# Patient Record
Sex: MALE | URBAN - METROPOLITAN AREA
[De-identification: names, ages, dates, MRNs, and addresses within clinical notes are randomized per-mention and may not be internally consistent; named-entity substitution may affect disease eponyms.]

---

## 2020-06-23 ENCOUNTER — WEB ENCOUNTER (OUTPATIENT)
Dept: URBAN - METROPOLITAN AREA CLINIC 92 | Facility: CLINIC | Age: 65
End: 2020-06-23

## 2020-06-24 ENCOUNTER — OFFICE VISIT (OUTPATIENT)
Dept: URBAN - METROPOLITAN AREA TELEHEALTH 2 | Facility: TELEHEALTH | Age: 65
End: 2020-06-24
Payer: COMMERCIAL

## 2020-06-24 ENCOUNTER — DASHBOARD ENCOUNTERS (OUTPATIENT)
Age: 65
End: 2020-06-24

## 2020-06-24 DIAGNOSIS — R10.84 ABDOMINAL PAIN, GENERALIZED: ICD-10-CM

## 2020-06-24 DIAGNOSIS — K59.01 CONSTIPATION: ICD-10-CM

## 2020-06-24 DIAGNOSIS — R07.89 ACUTE CHEST WALL PAIN: ICD-10-CM

## 2020-06-24 DIAGNOSIS — R12 HEART BURN: ICD-10-CM

## 2020-06-24 DIAGNOSIS — R14.0 BLOATING: ICD-10-CM

## 2020-06-24 DIAGNOSIS — I25.9 CAD (CORONARY ARTERY DISEASE): ICD-10-CM

## 2020-06-24 PROBLEM — 53741008 CORONARY ARTERY DISEASE: Status: ACTIVE | Noted: 2020-06-24

## 2020-06-24 PROBLEM — 29857009 CHEST PAIN: Status: ACTIVE | Noted: 2020-06-24

## 2020-06-24 PROBLEM — 16331000 HEARTBURN: Status: ACTIVE | Noted: 2020-06-24

## 2020-06-24 PROBLEM — 14760008 CONSTIPATION: Status: ACTIVE | Noted: 2020-06-24

## 2020-06-24 PROBLEM — 21522001 ABDOMINAL PAIN: Status: ACTIVE | Noted: 2020-06-24

## 2020-06-24 PROCEDURE — G9903 PT SCRN TBCO ID AS NON USER: HCPCS | Performed by: INTERNAL MEDICINE

## 2020-06-24 PROCEDURE — 3017F COLORECTAL CA SCREEN DOC REV: CPT | Performed by: INTERNAL MEDICINE

## 2020-06-24 PROCEDURE — 99203 OFFICE O/P NEW LOW 30 MIN: CPT | Performed by: INTERNAL MEDICINE

## 2020-06-24 PROCEDURE — G8417 CALC BMI ABV UP PARAM F/U: HCPCS | Performed by: INTERNAL MEDICINE

## 2020-06-24 PROCEDURE — G8427 DOCREV CUR MEDS BY ELIG CLIN: HCPCS | Performed by: INTERNAL MEDICINE

## 2020-06-24 PROCEDURE — 1036F TOBACCO NON-USER: CPT | Performed by: INTERNAL MEDICINE

## 2020-06-24 RX ORDER — LOSARTAN POTASSIUM 100 MG/1
1 TABLET TABLET ORAL ONCE A DAY
Status: ACTIVE | COMMUNITY

## 2020-06-24 RX ORDER — SUCRALFATE 1 G/1
1 TABLET ON AN EMPTY STOMACH TABLET ORAL TWICE A DAY
Status: ACTIVE | COMMUNITY

## 2020-06-24 RX ORDER — PRAVASTATIN SODIUM 40 MG/1
1 TABLET TABLET ORAL ONCE A DAY
Status: ACTIVE | COMMUNITY

## 2020-06-24 RX ORDER — BUSPIRONE HYDROCHLORIDE 5 MG/1
1 TABLET TABLET ORAL THREE TIMES A DAY
Status: ACTIVE | COMMUNITY

## 2020-06-24 RX ORDER — TICAGRELOR 90 MG/1
1 TABLET TABLET ORAL TWICE A DAY
Status: ACTIVE | COMMUNITY

## 2020-06-24 RX ORDER — ALPRAZOLAM 0.25 MG/1
1 TABLET TABLET ORAL TWICE A DAY
Status: ACTIVE | COMMUNITY

## 2020-06-24 RX ORDER — HYOSCYAMINE SULFATE 0.12 MG/ML
1 ML AS NEEDED LIQUID ORAL
Status: ACTIVE | COMMUNITY

## 2020-06-24 RX ORDER — FAMOTIDINE 20 MG/1
1 TABLET AT BEDTIME AS NEEDED TABLET, FILM COATED ORAL ONCE A DAY
Status: ACTIVE | COMMUNITY

## 2020-06-24 NOTE — HPI-TODAY'S VISIT:
This is a 64-year-old  male who presents today for initial consultation.  He has recently relocated to [facility] from [facility][note 3].  He has a few GI symptoms at present.  Most recently in the last month or so he presented to the emergency room with increasing substernal chest burning and epigastric burning abdominal pain.  He was given a GI cocktail and ultimately ruled in for cardiac pathology.  He required a heart catheterization and stent placement.  Since that time he has been taking an H2 blocker, sucralfate.  He notes approximately 50% improvement in his symptoms.  He has also had intermittent mild constipation with associated abdominal bloating.  He has taken over-the-counter laxatives with relief. He did see a gastroenterologist prior to his move a few weeks ago and was prescribed Levsin which has also provided some relief. He did have an upper endoscopy and colonoscopy approximately 5 years ago, which was reportedly normal.

## 2024-12-11 ENCOUNTER — OFFICE VISIT (OUTPATIENT)
Dept: RURAL CLINIC 2 | Facility: CLINIC | Age: 69
End: 2024-12-11
Payer: MEDICARE

## 2024-12-11 ENCOUNTER — LAB OUTSIDE AN ENCOUNTER (OUTPATIENT)
Dept: RURAL CLINIC 2 | Facility: CLINIC | Age: 69
End: 2024-12-11

## 2024-12-11 VITALS
SYSTOLIC BLOOD PRESSURE: 135 MMHG | BODY MASS INDEX: 31.36 KG/M2 | HEIGHT: 71 IN | DIASTOLIC BLOOD PRESSURE: 74 MMHG | TEMPERATURE: 97.1 F | WEIGHT: 224 LBS | HEART RATE: 57 BPM

## 2024-12-11 DIAGNOSIS — R10.13 DYSPEPSIA: ICD-10-CM

## 2024-12-11 DIAGNOSIS — K21.9 GERD WITHOUT ESOPHAGITIS: ICD-10-CM

## 2024-12-11 DIAGNOSIS — Z95.5 H/O HEART ARTERY STENT: ICD-10-CM

## 2024-12-11 DIAGNOSIS — Z79.02 ENCOUNTER FOR CURRENT LONG TERM USE OF ANTIPLATELET DRUG: ICD-10-CM

## 2024-12-11 DIAGNOSIS — I25.10 CORONARY ARTERY DISEASE INVOLVING NATIVE CORONARY ARTERY OF NATIVE HEART WITHOUT ANGINA PECTORIS: ICD-10-CM

## 2024-12-11 DIAGNOSIS — R05.3 CHRONIC COUGH: ICD-10-CM

## 2024-12-11 PROBLEM — 449681000124101: Status: ACTIVE | Noted: 2024-12-11

## 2024-12-11 PROBLEM — 266435005: Status: ACTIVE | Noted: 2024-12-11

## 2024-12-11 PROBLEM — 162031009: Status: ACTIVE | Noted: 2024-12-11

## 2024-12-11 PROBLEM — 53741008: Status: ACTIVE | Noted: 2024-12-11

## 2024-12-11 PROBLEM — 68154008: Status: ACTIVE | Noted: 2024-12-11

## 2024-12-11 PROBLEM — 428375006: Status: ACTIVE | Noted: 2024-12-11

## 2024-12-11 PROCEDURE — 99204 OFFICE O/P NEW MOD 45 MIN: CPT | Performed by: NURSE PRACTITIONER

## 2024-12-11 RX ORDER — ATORVASTATIN CALCIUM 20 MG/1
1 TABLET TABLET, FILM COATED ORAL ONCE A DAY
Status: ACTIVE | COMMUNITY

## 2024-12-11 RX ORDER — LOSARTAN POTASSIUM 100 MG/1
1 TABLET TABLET ORAL ONCE A DAY
Status: DISCONTINUED | COMMUNITY

## 2024-12-11 RX ORDER — CHLORTHALIDONE 25 MG/1
1 TABLET IN THE MORNING WITH FOOD TABLET ORAL
Status: DISCONTINUED | COMMUNITY

## 2024-12-11 RX ORDER — OMEPRAZOLE 20 MG/1
1 CAPSULE 1/2 TO 1 HOUR BEFORE MORNING MEAL CAPSULE, DELAYED RELEASE ORAL ONCE A DAY
Status: DISCONTINUED | COMMUNITY

## 2024-12-11 RX ORDER — CHLORTHALIDONE 25 MG/1
1 TABLET IN THE MORNING WITH FOOD TABLET ORAL
Status: ACTIVE | COMMUNITY

## 2024-12-11 RX ORDER — AMLODIPINE BESYLATE 5 MG/1
1 TABLET TABLET ORAL ONCE A DAY
Status: ACTIVE | COMMUNITY

## 2024-12-11 RX ORDER — ATORVASTATIN CALCIUM 40 MG/1
1 TABLET TABLET, FILM COATED ORAL ONCE A DAY
Status: DISCONTINUED | COMMUNITY

## 2024-12-11 RX ORDER — PRAVASTATIN SODIUM 40 MG/1
1 TABLET TABLET ORAL ONCE A DAY
Status: DISCONTINUED | COMMUNITY

## 2024-12-11 RX ORDER — AMLODIPINE BESYLATE 5 MG/1
1 TABLET TABLET ORAL ONCE A DAY
Status: DISCONTINUED | COMMUNITY

## 2024-12-11 RX ORDER — POTASSIUM CHLORIDE 750 MG/1
1 CAPSULE WITH FOOD CAPSULE, EXTENDED RELEASE ORAL TWICE A DAY
Status: ACTIVE | COMMUNITY

## 2024-12-11 RX ORDER — FAMOTIDINE 20 MG/1
1 TABLET AT BEDTIME AS NEEDED TABLET, FILM COATED ORAL ONCE A DAY
Status: DISCONTINUED | COMMUNITY

## 2024-12-11 RX ORDER — HYOSCYAMINE SULFATE 0.12 MG/ML
1 ML AS NEEDED SOLUTION ORAL
Status: DISCONTINUED | COMMUNITY

## 2024-12-11 RX ORDER — ALPRAZOLAM 0.25 MG/1
1 TABLET TABLET ORAL TWICE A DAY
Status: DISCONTINUED | COMMUNITY

## 2024-12-11 RX ORDER — TICAGRELOR 90 MG/1
1 TABLET TABLET ORAL TWICE A DAY
Status: DISCONTINUED | COMMUNITY

## 2024-12-11 RX ORDER — SUCRALFATE 1 G/1
1 TABLET ON AN EMPTY STOMACH TABLET ORAL TWICE A DAY
Status: DISCONTINUED | COMMUNITY

## 2024-12-11 RX ORDER — COLESTIPOL HYDROCHLORIDE 1 G/1
2 TABLETS TABLET, FILM COATED ORAL ONCE A DAY
Status: DISCONTINUED | COMMUNITY

## 2024-12-11 RX ORDER — LOSARTAN POTASSIUM 100 MG/1
1 TABLET TABLET, FILM COATED ORAL ONCE A DAY
Status: ACTIVE | COMMUNITY

## 2024-12-11 RX ORDER — FAMOTIDINE 40 MG/1
1 TABLET TABLET, FILM COATED ORAL ONCE A DAY
Status: ACTIVE | COMMUNITY

## 2024-12-11 RX ORDER — CLOPIDOGREL BISULFATE 75 MG/1
1 TABLET TABLET ORAL ONCE A DAY
Status: ACTIVE | COMMUNITY

## 2024-12-11 RX ORDER — POTASSIUM CHLORIDE 10 MEQ/1
1 TABLET WITH FOOD TABLET, FILM COATED, EXTENDED RELEASE ORAL TWICE A DAY
Status: DISCONTINUED | COMMUNITY

## 2024-12-11 RX ORDER — BUSPIRONE HYDROCHLORIDE 5 MG/1
1 TABLET TABLET ORAL THREE TIMES A DAY
Status: DISCONTINUED | COMMUNITY

## 2024-12-11 NOTE — HPI-TODAY'S VISIT:
This is a 64-year-old  male who presents today for initial consultation.  He has recently relocated to [facility] from [facility].  He has a few GI symptoms at present.  Most recently in the last month or so he presented to the emergency room with increasing substernal chest burning and epigastric burning abdominal pain.  He was given a GI cocktail and ultimately ruled in for cardiac pathology.  He required a heart catheterization and stent placement.  Since that time he has been taking an H2 blocker, sucralfate.  He notes approximately 50% improvement in his symptoms.  He has also had intermittent mild constipation with associated abdominal bloating.  He has taken over-the-counter laxatives with relief. He did see a gastroenterologist prior to his move a few weeks ago and was prescribed Levsin which has also provided some relief. He did have an upper endoscopy and colonoscopy approximately 5 years ago, which was reportedly normal. 12/11/2024 The pt is a 70 y/o gentleman  who is self referred for a 2 month h/o  burning  upper abdominal pain, acid reflux and cough after eating. He is now taking Famotidine twice daily with symptom improvement. No c/o nausea, vomiting or dysphagia. His last colonoscopy was 2 yrs ago and negative for colon polyps. H/o CAD, s/p cardiac catheterization with heart stent placement x2 in 2022. He continues on antiplatelet therapy with Plavix.

## 2025-01-27 ENCOUNTER — CLAIMS CREATED FROM THE CLAIM WINDOW (OUTPATIENT)
Dept: URBAN - METROPOLITAN AREA SURGERY CENTER 14 | Facility: SURGERY CENTER | Age: 70
End: 2025-01-27
Payer: MEDICARE

## 2025-01-27 ENCOUNTER — CLAIMS CREATED FROM THE CLAIM WINDOW (OUTPATIENT)
Dept: URBAN - METROPOLITAN AREA CLINIC 4 | Facility: CLINIC | Age: 70
End: 2025-01-27
Payer: MEDICARE

## 2025-01-27 ENCOUNTER — OFFICE VISIT (OUTPATIENT)
Dept: URBAN - METROPOLITAN AREA SURGERY CENTER 14 | Facility: SURGERY CENTER | Age: 70
End: 2025-01-27

## 2025-01-27 DIAGNOSIS — K30 DYSPEPSIA: ICD-10-CM

## 2025-01-27 DIAGNOSIS — K21.9 GASTRIC REFLUX: ICD-10-CM

## 2025-01-27 DIAGNOSIS — K44.9 HIATAL HERNIA: ICD-10-CM

## 2025-01-27 DIAGNOSIS — K22.9 IRREGULAR Z LINE OF ESOPHAGUS: ICD-10-CM

## 2025-01-27 DIAGNOSIS — K31.89 OTHER DISEASES OF STOMACH AND DUODENUM: ICD-10-CM

## 2025-01-27 DIAGNOSIS — K29.70 GASTRITIS, UNSPECIFIED, WITHOUT BLEEDING: ICD-10-CM

## 2025-01-27 DIAGNOSIS — K21.9 GASTRO-ESOPHAGEAL REFLUX DISEASE WITHOUT ESOPHAGITIS: ICD-10-CM

## 2025-01-27 PROCEDURE — 00731 ANES UPR GI NDSC PX NOS: CPT | Performed by: NURSE ANESTHETIST, CERTIFIED REGISTERED

## 2025-01-27 PROCEDURE — 88305 TISSUE EXAM BY PATHOLOGIST: CPT | Performed by: PATHOLOGY

## 2025-01-27 RX ORDER — CLOPIDOGREL BISULFATE 75 MG/1
1 TABLET TABLET ORAL ONCE A DAY
COMMUNITY

## 2025-01-27 RX ORDER — ATORVASTATIN CALCIUM 20 MG/1
1 TABLET TABLET, FILM COATED ORAL ONCE A DAY
COMMUNITY

## 2025-01-27 RX ORDER — CHLORTHALIDONE 25 MG/1
1 TABLET IN THE MORNING WITH FOOD TABLET ORAL
COMMUNITY

## 2025-01-27 RX ORDER — LOSARTAN POTASSIUM 100 MG/1
1 TABLET TABLET, FILM COATED ORAL ONCE A DAY
COMMUNITY

## 2025-01-27 RX ORDER — FAMOTIDINE 40 MG/1
1 TABLET TABLET, FILM COATED ORAL ONCE A DAY
COMMUNITY

## 2025-01-27 RX ORDER — POTASSIUM CHLORIDE 750 MG/1
1 CAPSULE WITH FOOD CAPSULE, EXTENDED RELEASE ORAL TWICE A DAY
COMMUNITY

## 2025-01-27 RX ORDER — AMLODIPINE BESYLATE 5 MG/1
1 TABLET TABLET ORAL ONCE A DAY
COMMUNITY

## 2025-06-02 ENCOUNTER — LAB OUTSIDE AN ENCOUNTER (OUTPATIENT)
Dept: RURAL CLINIC 2 | Facility: CLINIC | Age: 70
End: 2025-06-02

## 2025-06-02 ENCOUNTER — OFFICE VISIT (OUTPATIENT)
Dept: RURAL CLINIC 2 | Facility: CLINIC | Age: 70
End: 2025-06-02
Payer: MEDICARE

## 2025-06-02 ENCOUNTER — TELEPHONE ENCOUNTER (OUTPATIENT)
Dept: RURAL CLINIC 8 | Facility: CLINIC | Age: 70
End: 2025-06-02

## 2025-06-02 DIAGNOSIS — R19.8 CHANGE IN BOWEL MOVEMENT: ICD-10-CM

## 2025-06-02 DIAGNOSIS — Z79.02 ENCOUNTER FOR CURRENT LONG TERM USE OF ANTIPLATELET DRUG: ICD-10-CM

## 2025-06-02 DIAGNOSIS — Z95.5 H/O HEART ARTERY STENT: ICD-10-CM

## 2025-06-02 DIAGNOSIS — I25.9 CAD (CORONARY ARTERY DISEASE): ICD-10-CM

## 2025-06-02 DIAGNOSIS — Z86.0100 PERSONAL HISTORY OF COLON POLYPS, UNSPECIFIED: ICD-10-CM

## 2025-06-02 DIAGNOSIS — R11.0 NAUSEA: ICD-10-CM

## 2025-06-02 DIAGNOSIS — R10.11 RIGHT UPPER QUADRANT ABDOMINAL PAIN: ICD-10-CM

## 2025-06-02 DIAGNOSIS — R10.13 EPIGASTRIC PAIN: ICD-10-CM

## 2025-06-02 PROBLEM — 88111009: Status: ACTIVE | Noted: 2025-06-02

## 2025-06-02 PROBLEM — 79922009: Status: ACTIVE | Noted: 2025-06-02

## 2025-06-02 PROBLEM — 301717006: Status: ACTIVE | Noted: 2025-06-02

## 2025-06-02 PROBLEM — 422587007: Status: ACTIVE | Noted: 2025-06-02

## 2025-06-02 PROCEDURE — 99214 OFFICE O/P EST MOD 30 MIN: CPT | Performed by: NURSE PRACTITIONER

## 2025-06-02 RX ORDER — POTASSIUM CHLORIDE 750 MG/1
1 CAPSULE WITH FOOD CAPSULE, EXTENDED RELEASE ORAL TWICE A DAY
Status: ACTIVE | COMMUNITY

## 2025-06-02 RX ORDER — CHLORTHALIDONE 25 MG/1
1 TABLET IN THE MORNING WITH FOOD TABLET ORAL
Status: ACTIVE | COMMUNITY

## 2025-06-02 RX ORDER — FAMOTIDINE 40 MG/1
1 TABLET TABLET, FILM COATED ORAL ONCE A DAY
Status: ACTIVE | COMMUNITY

## 2025-06-02 RX ORDER — UBIDECARENONE 200 MG
AS DIRECTED CAPSULE ORAL
Status: ACTIVE | COMMUNITY

## 2025-06-02 RX ORDER — CLOPIDOGREL BISULFATE 75 MG/1
1 TABLET TABLET ORAL ONCE A DAY
Status: ACTIVE | COMMUNITY

## 2025-06-02 RX ORDER — ATORVASTATIN CALCIUM 20 MG/1
1 TABLET TABLET, FILM COATED ORAL ONCE A DAY
Status: ACTIVE | COMMUNITY

## 2025-06-02 RX ORDER — AMLODIPINE BESYLATE 5 MG/1
1 TABLET TABLET ORAL ONCE A DAY
Status: ACTIVE | COMMUNITY

## 2025-06-02 RX ORDER — LOSARTAN POTASSIUM 100 MG/1
1 TABLET TABLET, FILM COATED ORAL ONCE A DAY
Status: ACTIVE | COMMUNITY

## 2025-06-02 NOTE — HPI-TODAY'S VISIT:
This is a 64-year-old  male who presents today for initial consultation.  He has recently relocated to [facility] from [facility].  He has a few GI symptoms at present.  Most recently in the last month or so he presented to the emergency room with increasing substernal chest burning and epigastric burning abdominal pain.  He was given a GI cocktail and ultimately ruled in for cardiac pathology.  He required a heart catheterization and stent placement.  Since that time he has been taking an H2 blocker, sucralfate.  He notes approximately 50% improvement in his symptoms.  He has also had intermittent mild constipation with associated abdominal bloating.  He has taken over-the-counter laxatives with relief. He did see a gastroenterologist prior to his move a few weeks ago and was prescribed Levsin which has also provided some relief. He did have an upper endoscopy and colonoscopy approximately 5 years ago, which was reportedly normal. 12/11/2024 The pt is a 68 y/o gentleman  who is self referred for a 2 month h/o  burning  upper abdominal pain, acid reflux and cough after eating. He is now taking Famotidine twice daily with symptom improvement. No c/o nausea, vomiting or dysphagia. His last colonoscopy was 2 yrs ago and negative for colon polyps. H/o CAD, s/p cardiac catheterization with heart stent placement x2 in 2022. He continues on antiplatelet therapy with Plavix.  06/02/2025:The patient returns for follow-up for GERD reporting continued epigastric abdominal pain and occasional right upper quadrant pain with intermittent nausea.  He underwent an EGD in January of this year with findings of irregular Z-line and small hiatal hernia.  Biopsies of the GE junction revealed chronic inflammation, negative for Venegas's esophagus on pathology.  Antral biopsies were obtained and negative for H. pylori.  He is currently taking famotidine 40 mg daily and continues with abdominal pain.  He reportedly does not follow an antireflux diet.  He reports an increase in stool frequency over the past few months typically starts with a formed bowel movement followed by soft to loose stool and can occur up to 4 times per day. He offers no c/o abdominal pain or rectal bleeding. Previous colon exam was done 4-5 yrs ago and a benign polyp was found. Records to be requested.

## 2025-06-03 ENCOUNTER — LAB OUTSIDE AN ENCOUNTER (OUTPATIENT)
Dept: RURAL CLINIC 8 | Facility: CLINIC | Age: 70
End: 2025-06-03

## 2025-06-06 PROBLEM — 428283002: Status: ACTIVE | Noted: 2025-06-06

## 2025-06-06 LAB
ADENOVIRUS F 40/41: NOT DETECTED
CALPROTECTIN, STOOL - QDX: (no result)
CAMPYLOBACTER: NOT DETECTED
CLOSTRIDIUM DIFFICILE: NOT DETECTED
ENTAMOEBA HISTOLYTICA: NOT DETECTED
ENTEROAGGREGATIVE E.COLI: NOT DETECTED
ENTEROTOXIGENIC E.COLI: NOT DETECTED
ESCHERICHIA COLI O157: NOT DETECTED
GIARDIA LAMBLIA: NOT DETECTED
NOROVIRUS GI/GII: NOT DETECTED
PANCREATICELASTASE ELISA, STOOL: (no result)
ROTAVIRUS A: NOT DETECTED
SALMONELLA SPP.: NOT DETECTED
SHIGA-LIKE TOXIN PRODUCING E.COLI: NOT DETECTED
SHIGELLA SPP. / ENTEROINVASIVE E.COLI: NOT DETECTED
VIBRIO PARAHAEMOLYTICUS: NOT DETECTED
VIBRIO SPP.: NOT DETECTED
YERSINIA ENTEROCOLITICA: NOT DETECTED